# Patient Record
Sex: MALE | Race: WHITE | NOT HISPANIC OR LATINO
[De-identification: names, ages, dates, MRNs, and addresses within clinical notes are randomized per-mention and may not be internally consistent; named-entity substitution may affect disease eponyms.]

---

## 2023-03-21 ENCOUNTER — FORM ENCOUNTER (OUTPATIENT)
Age: 58
End: 2023-03-21

## 2023-03-22 ENCOUNTER — APPOINTMENT (OUTPATIENT)
Dept: MRI IMAGING | Facility: CLINIC | Age: 58
End: 2023-03-22
Payer: COMMERCIAL

## 2023-03-22 ENCOUNTER — APPOINTMENT (OUTPATIENT)
Dept: ORTHOPEDIC SURGERY | Facility: CLINIC | Age: 58
End: 2023-03-22
Payer: COMMERCIAL

## 2023-03-22 VITALS — WEIGHT: 245 LBS | BODY MASS INDEX: 32.47 KG/M2 | HEIGHT: 73 IN

## 2023-03-22 DIAGNOSIS — I10 ESSENTIAL (PRIMARY) HYPERTENSION: ICD-10-CM

## 2023-03-22 DIAGNOSIS — Z78.9 OTHER SPECIFIED HEALTH STATUS: ICD-10-CM

## 2023-03-22 DIAGNOSIS — Z87.891 PERSONAL HISTORY OF NICOTINE DEPENDENCE: ICD-10-CM

## 2023-03-22 PROBLEM — Z00.00 ENCOUNTER FOR PREVENTIVE HEALTH EXAMINATION: Status: ACTIVE | Noted: 2023-03-22

## 2023-03-22 PROCEDURE — 73721 MRI JNT OF LWR EXTRE W/O DYE: CPT | Mod: RT

## 2023-03-22 PROCEDURE — 73564 X-RAY EXAM KNEE 4 OR MORE: CPT | Mod: RT

## 2023-03-22 PROCEDURE — 99203 OFFICE O/P NEW LOW 30 MIN: CPT

## 2023-03-22 RX ORDER — NEBIVOLOL HYDROCHLORIDE 20 MG/1
TABLET ORAL
Refills: 0 | Status: ACTIVE | COMMUNITY

## 2023-03-22 NOTE — ASSESSMENT
[FreeTextEntry1] : Underlying pathology and treatment options discussed. \par 3/22/2023 Xrays of the right knee reveals medial and PF compartment narrowing. \par Plan for Stat MRI to r/o tears vs ruptured cyst. \par Provided rx for cane to help ambulate. \par Questions answered. \par Activities as tolerated. \par \par The documentation recorded by the scribe accurately reflects the service I personally performed and the decisions made by me.\par I, July Chambers, attest that this documentation has been prepared under the direction and in the presence of Provider Yoni Lima MD.\par \par The patient was seen by Yoni Lima MD.\par The following radiographs were ordered and read by me during this patient's visit. I reviewed each radiograph in detail with the patient, and discussed the findings as highlighted below.\par

## 2023-03-22 NOTE — HISTORY OF PRESENT ILLNESS
[Sudden] : sudden [8] : 8 [Sharp] : sharp [Shooting] : shooting [Stabbing] : stabbing [Throbbing] : throbbing [Constant] : constant [de-identified] : 3/22/2023: This is a 56 Yo male who heard a sudden pop while walking items from the van to his garage. Noted soreness before the injury. Denies fever/chill/shakes. Pain with bearing weight. Hx of works comp inj [] : no [FreeTextEntry5] : old injury W/C from  5 years ago (2017) saw Dr Ham , but this morning something pop in the knee coming out from his truck @ 8AM [de-identified] : none

## 2023-03-22 NOTE — PHYSICAL EXAM
[Right] : right knee [5___] : hamstring 5[unfilled]/5 [] : no erythema [TWNoteComboBox7] : flexion 110 degrees [de-identified] : extension 3 degrees

## 2023-03-29 ENCOUNTER — APPOINTMENT (OUTPATIENT)
Dept: ORTHOPEDIC SURGERY | Facility: CLINIC | Age: 58
End: 2023-03-29
Payer: COMMERCIAL

## 2023-03-29 DIAGNOSIS — S83.91XA SPRAIN OF UNSPECIFIED SITE OF RIGHT KNEE, INITIAL ENCOUNTER: ICD-10-CM

## 2023-03-29 PROCEDURE — 99213 OFFICE O/P EST LOW 20 MIN: CPT

## 2023-03-29 NOTE — PHYSICAL EXAM
[Right] : right knee [5___] : hamstring 5[unfilled]/5 [] : patellofemoral tenderness [TWNoteComboBox7] : flexion 110 degrees [de-identified] : extension 3 degrees

## 2023-03-29 NOTE — ASSESSMENT
[FreeTextEntry1] : Underlying pathology and treatment options discussed. \par 3/22/2023 Xrays of the right knee reveals medial and PF compartment narrowing. \par Plan for Stat MRI to r/o tears vs ruptured cyst. \par Provided rx for cane to help ambulate. \par \par 3/29/23: MRI reviewed with patient. 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 \par Denies MJL tenderness. No mechanical symptoms\par If medial tenderness and mechan symptoms persist would consider scope. \par If no improvement consider injection therapy. \par Questions addressed. Activity modifier as tolerated.\par \par The documentation recorded by the scribe accurately reflects the service I personally performed and the decisions made by me.\par I, July Chambers, attest that this documentation has been prepared under the direction and in the presence of Provider Yoni Lima MD.\par \par The patient was seen by Yoni Lima MD.\par The following radiographs were ordered and read by me during this patient's visit. I reviewed each radiograph in detail with the patient, and discussed the findings as highlighted below.\par

## 2023-03-29 NOTE — DATA REVIEWED
[MRI] : MRI [Right] : of the right [I independently reviewed and interpreted images and report] : I independently reviewed and interpreted images and report [Report was reviewed and noted in the chart] : The report was reviewed and noted in the chart [I reviewed the films/CD and agree] : I reviewed the films/CD and agree [FreeTextEntry1] : MRI right knee impression:\par 1. Significant interval change from prior exam which includes complex medial meniscal tearing with a large \par radial component adjacent to the posterior root, surrounding synovitis, and mild medial extrusion.\par 2. Lateral meniscal degeneration without tear.\par 3. Mild chronic MCL sprain with laxity.\par 4. Chondral loss in the medial and patellofemoral compartments with mild subchondral edema and cysts in the lateral femoral trochlea and slight subchondral edema in the superior central patella.\par 5. Moderate effusion, synovitis, and thick medial synovial plica.\par 6. Extensor mechanism tendinopathy.\par 7. Mild proximal tibiofibular syndesmotic effusion and small ganglion measuring 6 mm suspected ventral to the fibular head which appears nonaggressive.\par 8. Multiple new findings since prior study.

## 2023-03-29 NOTE — HISTORY OF PRESENT ILLNESS
[Sudden] : sudden [1] : 2 [Sharp] : sharp [Shooting] : shooting [Constant] : constant [de-identified] : 3/29/23: Follow up right knee. Symptoms are improved. He had MRI performed.\par \par 3/22/2023: This is a 56 Yo male who heard a sudden pop while walking items from the van to his garage. Noted soreness before the injury. Denies fever/chill/shakes. Pain with bearing weight. Hx of works comp inj [] : no [FreeTextEntry5] : old injury W/C from  5 years ago (2017) saw Dr Ham , but this morning something pop in the knee coming out from his truck @ 8AM [FreeTextEntry6] : Stiff [de-identified] : none

## 2023-05-10 ENCOUNTER — APPOINTMENT (OUTPATIENT)
Dept: ORTHOPEDIC SURGERY | Facility: CLINIC | Age: 58
End: 2023-05-10
Payer: COMMERCIAL

## 2023-05-10 VITALS — HEIGHT: 73 IN | WEIGHT: 245 LBS | BODY MASS INDEX: 32.47 KG/M2

## 2023-05-10 DIAGNOSIS — S83.231D COMPLEX TEAR OF MEDIAL MENISCUS, CURRENT INJURY, RIGHT KNEE, SUBSEQUENT ENCOUNTER: ICD-10-CM

## 2023-05-10 PROCEDURE — 99214 OFFICE O/P EST MOD 30 MIN: CPT | Mod: 25

## 2023-05-10 PROCEDURE — 20610 DRAIN/INJ JOINT/BURSA W/O US: CPT | Mod: RT

## 2023-05-10 PROCEDURE — J3490M: CUSTOM

## 2023-05-10 NOTE — PROCEDURE
[FreeTextEntry3] : \par A Large joint injection was performed of the [RIGHT KNEE]. The indication for this procedure was pain and inflammation, and patient had decreased mobility in the joint. Risks, benefits and alternatives to a steroid injection procedure were discussed; Risks outlined include but are not limited to infection, sepsis, bleeding, scarring, skin discoloration, temporary increase in pain, syncopal episode, failure to resolve symptoms, allergic reaction, symptom recurrence, and elevation of blood sugar in diabetics.. All questions were answered to the patient's apparent satisfaction and informed consent obtained. Prior to injection a 'Time Out' was conducted in accordance with [place] policy and the site and nature of procedure verified with the patient. \par  \par Procedure: The area of injection was prepared in a sterile fashion. The injection and aspiration was carried out utilizing sterile technique. The site was prepped with alcohol, betadine, ethyl chloride sprayed topically and sterile technique used.\par  \par ( X ) 1cc of Celestone(30mg/ml) \par (  )   1cc of 0.5% Bupivacaine \par (  )   2cc of 1% Lidocaine \par ( X ) 2cc of 1% Lidocaine \par \par Patient tolerated the procedure well and direct pressure was applied for hemostasis. The patient was reminded of potential post-injection risks including, but not limited to, delayed hypersensitivity reactions and/or infection. Ice tonight to the injection site.\par \par

## 2023-05-10 NOTE — ASSESSMENT
[FreeTextEntry1] : Underlying pathology and treatment options discussed. \par 3/22/2023 Xrays of the right knee reveals medial and PF compartment narrowing. \par Plan for Stat MRI to r/o tears vs ruptured cyst. \par Provided rx for cane to help ambulate. \par \par 3/29/23: MRI reviewed with patient. 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 \par Denies MJL tenderness. No mechanical symptoms\par If medial tenderness and mechan symptoms persist would consider scope. \par If no improvement consider injection therapy. \par Questions addressed. Activity modifier as tolerated.\par \par 5/10/23: Discussed conservative vs surgical options.\par Right knee CSI tolerated well.\par Return in 4 weeks to reassess.\par \par Progress note completed by Silvana Chopra PA-C under the direct supervision of Yoni Lima MD.\par

## 2023-05-10 NOTE — HISTORY OF PRESENT ILLNESS
[Sudden] : sudden [1] : 2 [Sharp] : sharp [Shooting] : shooting [Constant] : constant [Leisure] : leisure [Nothing helps with pain getting better] : Nothing helps with pain getting better [de-identified] : 5/10/23: Fu right knee. Notes sharp pain in the anterior knee with some instability.\par \par 3/29/23: Follow up right knee. Symptoms are improved. He had MRI performed.\par \par 3/22/2023: This is a 56 Yo male who heard a sudden pop while walking items from the van to his garage. Noted soreness before the injury. Denies fever/chill/shakes. Pain with bearing weight. Hx of works comp inj [] : no [FreeTextEntry5] : ABBEY is a 57 year old M who is here today for follow up right knee. Patient states there has been no improvement in pain since last visit.\par  [FreeTextEntry6] : Stiff [de-identified] : none

## 2023-05-10 NOTE — DATA REVIEWED
[FreeTextEntry1] : MRI right knee impression:\par 1. Significant interval change from prior exam which includes complex medial meniscal tearing with a large \par radial component adjacent to the posterior root, surrounding synovitis, and mild medial extrusion.\par 2. Lateral meniscal degeneration without tear.\par 3. Mild chronic MCL sprain with laxity.\par 4. Chondral loss in the medial and patellofemoral compartments with mild subchondral edema and cysts in the lateral femoral trochlea and slight subchondral edema in the superior central patella.\par 5. Moderate effusion, synovitis, and thick medial synovial plica.\par 6. Extensor mechanism tendinopathy.\par 7. Mild proximal tibiofibular syndesmotic effusion and small ganglion measuring 6 mm suspected ventral to the fibular head which appears nonaggressive.\par 8. Multiple new findings since prior study.

## 2023-05-10 NOTE — PHYSICAL EXAM
[Right] : right knee [5___] : hamstring 5[unfilled]/5 [] : no medial joint line tenderness [TWNoteComboBox7] : flexion 110 degrees [de-identified] : extension 3 degrees

## 2023-06-07 ENCOUNTER — APPOINTMENT (OUTPATIENT)
Dept: ORTHOPEDIC SURGERY | Facility: CLINIC | Age: 58
End: 2023-06-07

## 2025-01-01 ENCOUNTER — NON-APPOINTMENT (OUTPATIENT)
Age: 60
End: 2025-01-01